# Patient Record
Sex: FEMALE | Race: BLACK OR AFRICAN AMERICAN
[De-identification: names, ages, dates, MRNs, and addresses within clinical notes are randomized per-mention and may not be internally consistent; named-entity substitution may affect disease eponyms.]

---

## 2017-05-08 ENCOUNTER — HOSPITAL ENCOUNTER (EMERGENCY)
Dept: HOSPITAL 23 - CED | Age: 27
Discharge: HOME | End: 2017-05-08
Payer: COMMERCIAL

## 2017-05-08 DIAGNOSIS — J06.9: Primary | ICD-10-CM

## 2017-05-08 DIAGNOSIS — J45.909: ICD-10-CM

## 2017-05-08 DIAGNOSIS — H66.92: ICD-10-CM

## 2017-05-08 DIAGNOSIS — F17.200: ICD-10-CM

## 2020-06-23 ENCOUNTER — HOSPITAL ENCOUNTER (EMERGENCY)
Facility: HOSPITAL | Age: 30
Discharge: HOME | End: 2020-06-23
Attending: EMERGENCY MEDICINE | Admitting: EMERGENCY MEDICINE

## 2020-06-23 VITALS
HEIGHT: 63 IN | SYSTOLIC BLOOD PRESSURE: 144 MMHG | TEMPERATURE: 98 F | BODY MASS INDEX: 35.62 KG/M2 | DIASTOLIC BLOOD PRESSURE: 91 MMHG | RESPIRATION RATE: 14 BRPM | WEIGHT: 201.06 LBS | OXYGEN SATURATION: 95 % | HEART RATE: 92 BPM
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
## 2020-06-23 DIAGNOSIS — S00.06XA INSECT BITE OF SCALP, INITIAL ENCOUNTER: Primary | ICD-10-CM

## 2020-06-23 PROCEDURE — 99282 EMERGENCY DEPT VISIT SF MDM: CPT

## 2020-06-23 RX ORDER — SULFAMETHOXAZOLE AND TRIMETHOPRIM 800; 160 MG/1; MG/1
1 TABLET ORAL 2 TIMES DAILY
Qty: 14 TABLET | Refills: 0 | Status: SHIPPED | OUTPATIENT
Start: 2020-06-23 | End: 2022-11-11

## 2020-06-23 NOTE — DISCHARGE INSTRUCTIONS
Return for increased pain, increased swelling, fever or any other concerns.  Utilize warm compresses

## 2020-06-23 NOTE — ED PROVIDER NOTES
"Subjective   History of Present Illness  Painful knot on head  30-year-old female states she has a painful knot on top of her head that she thinks it may be an insect bite from over the weekend when she was fishing.  She reports no fevers or chills but reports some increased pain and some drainage.  She denies tick bites  Review of Systems   Constitutional: Negative.    Skin: Positive for rash and wound.   Neurological: Negative.        History reviewed. No pertinent past medical history.    Allergies   Allergen Reactions   • Wellbutrin [Bupropion] Hives       History reviewed. No pertinent surgical history.    No family history on file.    Social History     Socioeconomic History   • Marital status: Single     Spouse name: Not on file   • Number of children: Not on file   • Years of education: Not on file   • Highest education level: Not on file       Prior to Admission medications    Not on File     /94 (BP Location: Left arm, Patient Position: Sitting)   Pulse 105   Temp 97.8 °F (36.6 °C) (Oral)   Resp 14   Ht 160 cm (63\")   Wt 91.2 kg (201 lb 1 oz)   LMP 06/04/2020   SpO2 94%   Breastfeeding No   BMI 35.62 kg/m²   I examined the patient using the appropriate personal protective equipment.        Objective   Physical Exam  General: Well-appearing, no acute distress  Psych: Oriented, pleasant affect  Respirations: Clear, nonlabored respirations  Skin: There is a excoriated and crusted small about 5 mm papule on the top of the scalp with some slight surrounding erythema there is no fluctuance or drainage, there are no other lesions  Procedures           ED Course                                           MDM  Patient was advised the findings.  I do not see any additional lesions to suggest shingles although this would also be in the differential.  Given her reported history of insect bite, this may be an infected insect bite I do not see any ticks or stingers.  She was prescribed Bactrim she is " discharged good condition she was given warning signs for return.  Final diagnoses:   Insect bite of scalp, initial encounter            Esa Galeas MD  06/23/20 0974

## 2020-06-23 NOTE — ED NOTES
"Pt reports yesterday during the day she noted a knot on the top of her head that she thought was a bug bite, states that today she starting have severe pain with it and pressure states she feels like she can feel her pulse in the \"cyst\"   Pt is very tearful and anxious      Nancy Shoemaker RN  06/23/20 0560    "

## 2020-06-26 ENCOUNTER — HOSPITAL ENCOUNTER (EMERGENCY)
Facility: HOSPITAL | Age: 30
Discharge: HOME | End: 2020-06-26
Attending: EMERGENCY MEDICINE | Admitting: EMERGENCY MEDICINE

## 2020-06-26 VITALS
TEMPERATURE: 98.6 F | BODY MASS INDEX: 36.31 KG/M2 | HEART RATE: 81 BPM | DIASTOLIC BLOOD PRESSURE: 55 MMHG | WEIGHT: 197.31 LBS | HEIGHT: 62 IN | OXYGEN SATURATION: 98 % | RESPIRATION RATE: 16 BRPM | SYSTOLIC BLOOD PRESSURE: 113 MMHG

## 2020-06-26 DIAGNOSIS — L02.811 SCALP ABSCESS: Primary | ICD-10-CM

## 2020-06-26 DIAGNOSIS — L02.211 ABDOMINAL WALL ABSCESS: ICD-10-CM

## 2020-06-26 PROCEDURE — 87070 CULTURE OTHR SPECIMN AEROBIC: CPT | Performed by: NURSE PRACTITIONER

## 2020-06-26 PROCEDURE — 87205 SMEAR GRAM STAIN: CPT | Performed by: NURSE PRACTITIONER

## 2020-06-26 PROCEDURE — 87147 CULTURE TYPE IMMUNOLOGIC: CPT | Performed by: NURSE PRACTITIONER

## 2020-06-26 PROCEDURE — 87186 SC STD MICRODIL/AGAR DIL: CPT | Performed by: NURSE PRACTITIONER

## 2020-06-26 PROCEDURE — 99283 EMERGENCY DEPT VISIT LOW MDM: CPT

## 2020-06-26 RX ORDER — CEPHALEXIN 500 MG/1
500 CAPSULE ORAL 3 TIMES DAILY
Qty: 21 CAPSULE | Refills: 0 | Status: SHIPPED | OUTPATIENT
Start: 2020-06-26 | End: 2022-11-11

## 2020-06-28 LAB
BACTERIA SPEC AEROBE CULT: ABNORMAL
BACTERIA SPEC AEROBE CULT: ABNORMAL
GRAM STN SPEC: ABNORMAL

## 2020-08-21 ENCOUNTER — HOSPITAL ENCOUNTER (EMERGENCY)
Facility: HOSPITAL | Age: 30
Discharge: LEFT BEFORE DISCHARGE | End: 2020-08-21
Admitting: EMERGENCY MEDICINE

## 2020-08-21 VITALS
WEIGHT: 200 LBS | SYSTOLIC BLOOD PRESSURE: 161 MMHG | DIASTOLIC BLOOD PRESSURE: 93 MMHG | OXYGEN SATURATION: 99 % | HEIGHT: 63 IN | HEART RATE: 146 BPM | BODY MASS INDEX: 35.44 KG/M2 | RESPIRATION RATE: 26 BRPM | TEMPERATURE: 100.1 F

## 2020-08-21 DIAGNOSIS — Z53.21 ELOPED FROM EMERGENCY DEPARTMENT: Primary | ICD-10-CM

## 2020-08-21 DIAGNOSIS — F15.10 METHAMPHETAMINE ABUSE (HCC): ICD-10-CM

## 2020-08-21 LAB
ALBUMIN SERPL-MCNC: 5.1 G/DL (ref 3.5–5.2)
ALBUMIN/GLOB SERPL: 2 G/DL
ALP SERPL-CCNC: 92 U/L (ref 39–117)
ALT SERPL W P-5'-P-CCNC: 16 U/L (ref 1–33)
ANION GAP SERPL CALCULATED.3IONS-SCNC: 15 MMOL/L (ref 5–15)
APAP SERPL-MCNC: <5 MCG/ML (ref 10–30)
AST SERPL-CCNC: 30 U/L (ref 1–32)
BASOPHILS # BLD AUTO: 0 10*3/MM3 (ref 0–0.2)
BASOPHILS NFR BLD AUTO: 0.3 % (ref 0–1.5)
BILIRUB SERPL-MCNC: 0.5 MG/DL (ref 0–1.2)
BUN SERPL-MCNC: 11 MG/DL (ref 6–20)
BUN SERPL-MCNC: ABNORMAL MG/DL
BUN/CREAT SERPL: ABNORMAL
CALCIUM SPEC-SCNC: 9.6 MG/DL (ref 8.6–10.5)
CHLORIDE SERPL-SCNC: 101 MMOL/L (ref 98–107)
CO2 SERPL-SCNC: 24 MMOL/L (ref 22–29)
CREAT SERPL-MCNC: 0.84 MG/DL (ref 0.57–1)
DEPRECATED RDW RBC AUTO: 58.2 FL (ref 37–54)
EOSINOPHIL # BLD AUTO: 0 10*3/MM3 (ref 0–0.4)
EOSINOPHIL NFR BLD AUTO: 0.1 % (ref 0.3–6.2)
ERYTHROCYTE [DISTWIDTH] IN BLOOD BY AUTOMATED COUNT: 19.4 % (ref 12.3–15.4)
ETHANOL UR QL: <0.01 %
GFR SERPL CREATININE-BSD FRML MDRD: 98 ML/MIN/1.73
GLOBULIN UR ELPH-MCNC: 2.6 GM/DL
GLUCOSE SERPL-MCNC: 168 MG/DL (ref 65–99)
HCT VFR BLD AUTO: 30.3 % (ref 34–46.6)
HGB BLD-MCNC: 9.7 G/DL (ref 12–15.9)
LYMPHOCYTES # BLD AUTO: 2 10*3/MM3 (ref 0.7–3.1)
LYMPHOCYTES NFR BLD AUTO: 24.6 % (ref 19.6–45.3)
MCH RBC QN AUTO: 27.6 PG (ref 26.6–33)
MCHC RBC AUTO-ENTMCNC: 32.1 G/DL (ref 31.5–35.7)
MCV RBC AUTO: 86 FL (ref 79–97)
MONOCYTES # BLD AUTO: 1.3 10*3/MM3 (ref 0.1–0.9)
MONOCYTES NFR BLD AUTO: 15.5 % (ref 5–12)
NEUTROPHILS NFR BLD AUTO: 5 10*3/MM3 (ref 1.7–7)
NEUTROPHILS NFR BLD AUTO: 59.5 % (ref 42.7–76)
NRBC BLD AUTO-RTO: 0 /100 WBC (ref 0–0.2)
PLATELET # BLD AUTO: 392 10*3/MM3 (ref 140–450)
PMV BLD AUTO: 7.7 FL (ref 6–12)
POTASSIUM SERPL-SCNC: 3.3 MMOL/L (ref 3.5–5.2)
PROT SERPL-MCNC: 7.7 G/DL (ref 6–8.5)
RBC # BLD AUTO: 3.53 10*6/MM3 (ref 3.77–5.28)
SALICYLATES SERPL-MCNC: <0.3 MG/DL
SODIUM SERPL-SCNC: 140 MMOL/L (ref 136–145)
T4 FREE SERPL-MCNC: 1.32 NG/DL (ref 0.93–1.7)
T4 SERPL-MCNC: 9.15 MCG/DL (ref 4.5–11.7)
TSH SERPL DL<=0.05 MIU/L-ACNC: 1.41 UIU/ML (ref 0.27–4.2)
TSH SERPL DL<=0.05 MIU/L-ACNC: 1.42 UIU/ML (ref 0.27–4.2)
WBC # BLD AUTO: 8.3 10*3/MM3 (ref 3.4–10.8)

## 2020-08-21 PROCEDURE — 85025 COMPLETE CBC W/AUTO DIFF WBC: CPT | Performed by: NURSE PRACTITIONER

## 2020-08-21 PROCEDURE — 80307 DRUG TEST PRSMV CHEM ANLYZR: CPT | Performed by: NURSE PRACTITIONER

## 2020-08-21 PROCEDURE — 80053 COMPREHEN METABOLIC PANEL: CPT | Performed by: NURSE PRACTITIONER

## 2020-08-21 PROCEDURE — 84443 ASSAY THYROID STIM HORMONE: CPT | Performed by: NURSE PRACTITIONER

## 2020-08-21 PROCEDURE — 84439 ASSAY OF FREE THYROXINE: CPT | Performed by: NURSE PRACTITIONER

## 2020-08-21 PROCEDURE — 25010000002 LORAZEPAM PER 2 MG: Performed by: NURSE PRACTITIONER

## 2020-08-21 PROCEDURE — 99283 EMERGENCY DEPT VISIT LOW MDM: CPT

## 2020-08-21 PROCEDURE — 96374 THER/PROPH/DIAG INJ IV PUSH: CPT

## 2020-08-21 RX ORDER — LORAZEPAM 2 MG/ML
1 INJECTION INTRAMUSCULAR ONCE
Status: COMPLETED | OUTPATIENT
Start: 2020-08-21 | End: 2020-08-21

## 2020-08-21 RX ORDER — HALOPERIDOL 5 MG/ML
1 INJECTION INTRAMUSCULAR ONCE
Status: DISCONTINUED | OUTPATIENT
Start: 2020-08-21 | End: 2020-08-21

## 2020-08-21 RX ORDER — HALOPERIDOL 5 MG/ML
10 INJECTION INTRAMUSCULAR ONCE
Status: DISCONTINUED | OUTPATIENT
Start: 2020-08-21 | End: 2020-08-21

## 2020-08-21 RX ORDER — SODIUM CHLORIDE 0.9 % (FLUSH) 0.9 %
10 SYRINGE (ML) INJECTION AS NEEDED
Status: DISCONTINUED | OUTPATIENT
Start: 2020-08-21 | End: 2020-08-21 | Stop reason: HOSPADM

## 2020-08-21 RX ADMIN — SODIUM CHLORIDE 1000 ML: 900 INJECTION, SOLUTION INTRAVENOUS at 00:58

## 2020-08-21 RX ADMIN — LORAZEPAM 1 MG: 2 INJECTION INTRAMUSCULAR; INTRAVENOUS at 00:48

## 2020-08-21 NOTE — ED PROVIDER NOTES
Subjective   30-year-old hyperactive, tangential female presents via [employer] for methamphetamine use.  Officer brought the patient reports that the patient had put a call out for alleged kidnapping.  Upon arrival patient was in her underwear and laying on a towel and thought that she was on the beach.     1. Location: Denies  2. Quality: Hyperactive  3. Severity: 0  4. Worsening factors: None   5. Alleviating factors: None  6. Onset: Prior to arrival  7. Radiation: None  8. Frequency: constant  9. Co-morbidities: No past medical history on file.  Methamphetamine use   10. Source: patient and [employer]            Review of Systems   HENT: Negative for ear discharge and rhinorrhea.    Respiratory: Negative for chest tightness and shortness of breath.    Cardiovascular: Negative for chest pain and palpitations.   Gastrointestinal: Negative for abdominal pain, nausea and vomiting.   Musculoskeletal: Negative for arthralgias, myalgias and neck stiffness.   Skin: Negative for color change, pallor, rash and wound.   Neurological: Negative for dizziness, speech difficulty, weakness, numbness and headaches.   Psychiatric/Behavioral: Negative for sleep disturbance. The patient is nervous/anxious and is hyperactive.    All other systems reviewed and are negative.      No past medical history on file.    Allergies   Allergen Reactions   • Wellbutrin [Bupropion] Hives       No past surgical history on file.    No family history on file.    Social History     Socioeconomic History   • Marital status: Single     Spouse name: Not on file   • Number of children: Not on file   • Years of education: Not on file   • Highest education level: Not on file           Objective   Physical Exam   Constitutional: She is oriented to person, place, and time. She appears well-developed and well-nourished. She is active. She appears distressed.   HENT:   Head: Normocephalic and atraumatic. Head is without raccoon's eyes and without Ernst's sign.   Right  Ear: External ear normal. No drainage.   Left Ear: External ear normal. No drainage.   Nose: Nose normal. No rhinorrhea.   Mouth/Throat: Uvula is midline, oropharynx is clear and moist and mucous membranes are normal.   Eyes: Pupils are equal, round, and reactive to light. Conjunctivae, EOM and lids are normal.   Slit lamp exam:       The right eye shows no hyphema.        The left eye shows no hyphema.   Neck: Trachea normal, normal range of motion, full passive range of motion without pain and phonation normal. Neck supple.   Cardiovascular: Normal rate, regular rhythm, S1 normal, S2 normal, normal heart sounds, intact distal pulses and normal pulses. Exam reveals no gallop and no friction rub.   No murmur heard.  Pulses:       Radial pulses are 2+ on the right side, and 2+ on the left side.        Dorsalis pedis pulses are 2+ on the right side, and 2+ on the left side.        Posterior tibial pulses are 2+ on the right side, and 2+ on the left side.   Pulmonary/Chest: Effort normal and breath sounds normal. No respiratory distress. She has no wheezes. She has no rales. She exhibits no tenderness.   Neurological: She is alert and oriented to person, place, and time. She has normal strength. GCS eye subscore is 4. GCS verbal subscore is 5. GCS motor subscore is 6.   Skin: Skin is warm and dry. Capillary refill takes less than 2 seconds. No rash noted.   Psychiatric: Her mood appears anxious. Her speech is rapid and/or pressured and tangential. She is hyperactive. She is not actively hallucinating. Thought content is delusional. Cognition and memory are normal. She expresses impulsivity. She is inattentive.   Nursing note and vitals reviewed.      Procedures           ED Course      No radiology results for the last day  Medications   LORazepam (ATIVAN) injection 1 mg (1 mg Intravenous Given 8/21/20 0048)   sodium chloride 0.9 % bolus 1,000 mL (0 mL Intravenous Stopped 8/21/20 0138)     Labs Reviewed   COMPREHENSIVE  METABOLIC PANEL - Abnormal; Notable for the following components:       Result Value    Glucose 168 (*)     Potassium 3.3 (*)     All other components within normal limits    Narrative:     GFR Normal >60  Chronic Kidney Disease <60  Kidney Failure <15     ACETAMINOPHEN LEVEL - Abnormal; Notable for the following components:    Acetaminophen <5.0 (*)     All other components within normal limits    Narrative:     Acetaminophen Therapeutic Range  5-20 ug/mL      Hours after ingestion            Toxic Value    4 Hours                           150 ug/mL    8 Hours                            70 ug/mL   12 Hours                            40 ug/mL   16 Hours                            20 ug/mL    These values apply to a single ingestion only.    CBC WITH AUTO DIFFERENTIAL - Abnormal; Notable for the following components:    RBC 3.53 (*)     Hemoglobin 9.7 (*)     Hematocrit 30.3 (*)     RDW 19.4 (*)     RDW-SD 58.2 (*)     Monocyte % 15.5 (*)     Eosinophil % 0.1 (*)     Monocytes, Absolute 1.30 (*)     All other components within normal limits   SALICYLATE LEVEL - Normal   TSH - Normal   TSH - Normal   T4, FREE - Normal    Narrative:     Results may be falsely increased if patient taking Biotin.     BUN - Normal   ETHANOL    Narrative:     Plasma Ethanol Clinical Symptoms:    ETOH (%)               Clinical Symptom  .01-.05              No apparent influence  .03-.12              Euphoria, Diminished judgment and attention   .09-.25              Impaired comprehension, Muscle incoordination  .18-.30              Confusion, Staggered gait, Slurred speech  .25-.40              Markedly decreased response to stimuli, unable to stand or                        walk, vomitting, sleep or stupor  .35-.50              Comatose, Anesthesia, Subnormal body temperature       T4   CBC AND DIFFERENTIAL    Narrative:     The following orders were created for panel order CBC & Differential.  Procedure                                Abnormality         Status                     ---------                               -----------         ------                     CBC Auto Differential[[id number]]        Abnormal            Final result                 Please view results for these tests on the individual orders.                                          MDM  Number of Diagnoses or Management Options  Eloped from emergency department:   Methamphetamine abuse (CMS/[facility]):   Diagnosis management comments: Chart Review: 6/26/2020 patient was seen at this facility for scalp and abdominal wall abscess.  Comorbidity: No past medical history on file.  Imaging: Was interpreted by physician and reviewed by myself:    Patient undressed and placed in gown for exam.  Appropriate PPE worn during patient exam. 30-year-old hyperactive, tangential female presents via [employer] for methamphetamine use.  Officer brought the patient reports that the patient had put a call out for alleged kidnapping.  Upon arrival patient was in her underwear and laying on a towel and thought that she was on the beach.  IV established and labs obtained.  Normal saline 1 L bolus and Ativan 1 mg IV given.  Upon reassessment, patient remains hyperactive, tangential, and pacing in the room.  An order was given for Haldol due to patient yelling obscenities.  Patient requested Geodon by name.  Order was changed from Haldol to Geodon.  Upon nurse entering the room to administer medication, patient reported she would like to leave.  Patient eloped.    Disposition/Treatment: Discussed results with patient, verbalized understanding.  Discussed reasons to return to the ER, patient verbalized understanding.  Agreeable with plan of care.  Patient was stable upon discharge.               Amount and/or Complexity of Data Reviewed  Clinical lab tests: reviewed  Decide to obtain previous medical records or to obtain history from someone other than the patient: yes    Patient Progress  Patient progress:  stable      Final diagnoses:   Eloped from emergency department   Methamphetamine abuse (CMS/[facility])            Blaire Stout NP  08/21/20 0425

## 2020-08-21 NOTE — ED NOTES
"Pt arrived to ED by [employer]. Officer states pt was found in a manic state. Pt admits to using meth yesterday, states she did \"1 line\". Pt not compliant with staff, pt not wearing gown and will not allow staff to take a full set of VS. Pt appears to be very verbally hyperactive and is walking around the room and throwing her belongings around the room. Belongings were locked up by security, all furniture removed from pt room for safety. Pt is at the sink attempting to bathe herself and is rubbing hand  all over her body stating \"If I get covid 19 it's because of you all\", pt will not wear mask. IV fluids started and pt is still walking around the room. Pt will not sit in bed. Calming techniques attempted with pt with no success. Security and PD at bedside.      Apoorva Gonzalez LPN  08/21/20 0103    Pt states she also takes suboxone and smokes marijuana     Apoorva Gonzalez LPN  08/21/20 0105    Pt denies SI/HI     Apoorva Gonzalez LPN  08/21/20 0118    "

## 2022-11-11 ENCOUNTER — HOSPITAL ENCOUNTER (OUTPATIENT)
Facility: HOSPITAL | Age: 32
Setting detail: OBSERVATION
Discharge: HOME | End: 2022-11-12
Attending: EMERGENCY MEDICINE | Admitting: INTERNAL MEDICINE

## 2022-11-11 ENCOUNTER — APPOINTMENT (OUTPATIENT)
Dept: CT IMAGING | Facility: HOSPITAL | Age: 32
End: 2022-11-11

## 2022-11-11 ENCOUNTER — APPOINTMENT (OUTPATIENT)
Dept: GENERAL RADIOLOGY | Facility: HOSPITAL | Age: 32
End: 2022-11-11

## 2022-11-11 ENCOUNTER — APPOINTMENT (OUTPATIENT)
Dept: CARDIOLOGY | Facility: HOSPITAL | Age: 32
End: 2022-11-11

## 2022-11-11 DIAGNOSIS — R55 SYNCOPE, UNSPECIFIED SYNCOPE TYPE: Primary | ICD-10-CM

## 2022-11-11 PROBLEM — D50.9 IRON DEFICIENCY ANEMIA: Status: ACTIVE | Noted: 2022-11-11

## 2022-11-11 PROBLEM — F32.A ANXIETY AND DEPRESSION: Status: ACTIVE | Noted: 2022-11-11

## 2022-11-11 PROBLEM — F41.9 ANXIETY AND DEPRESSION: Status: ACTIVE | Noted: 2022-11-11

## 2022-11-11 PROBLEM — E87.6 HYPOKALEMIA: Status: ACTIVE | Noted: 2022-11-11

## 2022-11-11 PROBLEM — I10 ESSENTIAL HYPERTENSION: Status: ACTIVE | Noted: 2022-11-11

## 2022-11-11 LAB
ALBUMIN SERPL-MCNC: 3.8 G/DL (ref 3.5–5.2)
ALBUMIN/GLOB SERPL: 1.3 G/DL
ALP SERPL-CCNC: 104 U/L (ref 39–117)
ALT SERPL W P-5'-P-CCNC: 12 U/L (ref 1–33)
AMPHET+METHAMPHET UR QL: NEGATIVE
ANION GAP SERPL CALCULATED.3IONS-SCNC: 11 MMOL/L (ref 5–15)
ANION GAP SERPL CALCULATED.3IONS-SCNC: 9 MMOL/L (ref 5–15)
AST SERPL-CCNC: 14 U/L (ref 1–32)
B-HCG UR QL: NEGATIVE
BACTERIA UR QL AUTO: ABNORMAL /HPF
BARBITURATES UR QL SCN: NEGATIVE
BASOPHILS # BLD AUTO: 0 10*3/MM3 (ref 0–0.2)
BASOPHILS NFR BLD AUTO: 0.4 % (ref 0–1.5)
BENZODIAZ UR QL SCN: NEGATIVE
BH CV XLRA MEAS LEFT DIST CCA EDV: -34.5 CM/SEC
BH CV XLRA MEAS LEFT DIST CCA PSV: -122 CM/SEC
BH CV XLRA MEAS LEFT DIST ICA EDV: -52.9 CM/SEC
BH CV XLRA MEAS LEFT DIST ICA PSV: -114 CM/SEC
BH CV XLRA MEAS LEFT ICA/CCA RATIO: -0.7
BH CV XLRA MEAS LEFT PROX CCA EDV: 43.9 CM/SEC
BH CV XLRA MEAS LEFT PROX CCA PSV: 162 CM/SEC
BH CV XLRA MEAS LEFT PROX ECA PSV: -135 CM/SEC
BH CV XLRA MEAS LEFT PROX ICA EDV: -45.9 CM/SEC
BH CV XLRA MEAS LEFT PROX ICA PSV: -114 CM/SEC
BH CV XLRA MEAS LEFT PROX SCLA PSV: 188 CM/SEC
BH CV XLRA MEAS LEFT VERTEBRAL A PSV: -69.3 CM/SEC
BH CV XLRA MEAS RIGHT DIST CCA EDV: -35.3 CM/SEC
BH CV XLRA MEAS RIGHT DIST CCA PSV: -113 CM/SEC
BH CV XLRA MEAS RIGHT DIST ICA EDV: -41.6 CM/SEC
BH CV XLRA MEAS RIGHT DIST ICA PSV: -107 CM/SEC
BH CV XLRA MEAS RIGHT ICA/CCA RATIO: -0.82
BH CV XLRA MEAS RIGHT PROX CCA EDV: 29.6 CM/SEC
BH CV XLRA MEAS RIGHT PROX CCA PSV: 139 CM/SEC
BH CV XLRA MEAS RIGHT PROX ECA PSV: -130 CM/SEC
BH CV XLRA MEAS RIGHT PROX ICA EDV: -39.5 CM/SEC
BH CV XLRA MEAS RIGHT PROX ICA PSV: -114 CM/SEC
BH CV XLRA MEAS RIGHT PROX SCLA PSV: 126 CM/SEC
BH CV XLRA MEAS RIGHT VERTEBRAL A PSV: 84 CM/SEC
BILIRUB SERPL-MCNC: <0.2 MG/DL (ref 0–1.2)
BILIRUB UR QL STRIP: NEGATIVE
BUN SERPL-MCNC: 6 MG/DL (ref 6–20)
BUN SERPL-MCNC: 6 MG/DL (ref 6–20)
BUN/CREAT SERPL: 10.7 (ref 7–25)
BUN/CREAT SERPL: 8.8 (ref 7–25)
CALCIUM SPEC-SCNC: 8.7 MG/DL (ref 8.6–10.5)
CALCIUM SPEC-SCNC: 8.9 MG/DL (ref 8.6–10.5)
CANNABINOIDS SERPL QL: NEGATIVE
CHLORIDE SERPL-SCNC: 102 MMOL/L (ref 98–107)
CHLORIDE SERPL-SCNC: 104 MMOL/L (ref 98–107)
CLARITY UR: ABNORMAL
CO2 SERPL-SCNC: 25 MMOL/L (ref 22–29)
CO2 SERPL-SCNC: 25 MMOL/L (ref 22–29)
COCAINE UR QL: NEGATIVE
COLOR UR: YELLOW
CREAT SERPL-MCNC: 0.56 MG/DL (ref 0.57–1)
CREAT SERPL-MCNC: 0.68 MG/DL (ref 0.57–1)
DEPRECATED RDW RBC AUTO: 52.9 FL (ref 37–54)
EGFRCR SERPLBLD CKD-EPI 2021: 120.3 ML/MIN/1.73
EGFRCR SERPLBLD CKD-EPI 2021: 126.1 ML/MIN/1.73
EOSINOPHIL # BLD AUTO: 0.1 10*3/MM3 (ref 0–0.4)
EOSINOPHIL NFR BLD AUTO: 1.9 % (ref 0.3–6.2)
ERYTHROCYTE [DISTWIDTH] IN BLOOD BY AUTOMATED COUNT: 18.1 % (ref 12.3–15.4)
ETHANOL UR QL: <0.01 %
FERRITIN SERPL-MCNC: 6.8 NG/ML (ref 13–150)
FOLATE SERPL-MCNC: 9.55 NG/ML (ref 4.78–24.2)
GLOBULIN UR ELPH-MCNC: 2.9 GM/DL
GLUCOSE SERPL-MCNC: 119 MG/DL (ref 65–99)
GLUCOSE SERPL-MCNC: 169 MG/DL (ref 65–99)
GLUCOSE UR STRIP-MCNC: NEGATIVE MG/DL
HBA1C MFR BLD: 5.6 % (ref 3.5–5.6)
HCT VFR BLD AUTO: 28.9 % (ref 34–46.6)
HGB BLD-MCNC: 9.1 G/DL (ref 12–15.9)
HGB UR QL STRIP.AUTO: NEGATIVE
HOLD SPECIMEN: NORMAL
HOLD SPECIMEN: NORMAL
HYALINE CASTS UR QL AUTO: ABNORMAL /LPF
IRON 24H UR-MRATE: 20 MCG/DL (ref 37–145)
IRON SATN MFR SERPL: 5 % (ref 20–50)
KETONES UR QL STRIP: ABNORMAL
LEUKOCYTE ESTERASE UR QL STRIP.AUTO: NEGATIVE
LYMPHOCYTES # BLD AUTO: 2.1 10*3/MM3 (ref 0.7–3.1)
LYMPHOCYTES NFR BLD AUTO: 37.8 % (ref 19.6–45.3)
MAGNESIUM SERPL-MCNC: 1.6 MG/DL (ref 1.6–2.6)
MAXIMAL PREDICTED HEART RATE: 190 BPM
MCH RBC QN AUTO: 25.9 PG (ref 26.6–33)
MCHC RBC AUTO-ENTMCNC: 31.4 G/DL (ref 31.5–35.7)
MCV RBC AUTO: 82.5 FL (ref 79–97)
METHADONE UR QL SCN: NEGATIVE
MONOCYTES # BLD AUTO: 0.3 10*3/MM3 (ref 0.1–0.9)
MONOCYTES NFR BLD AUTO: 5.8 % (ref 5–12)
MUCOUS THREADS URNS QL MICRO: ABNORMAL /HPF
NEUTROPHILS NFR BLD AUTO: 3 10*3/MM3 (ref 1.7–7)
NEUTROPHILS NFR BLD AUTO: 54.1 % (ref 42.7–76)
NITRITE UR QL STRIP: NEGATIVE
NRBC BLD AUTO-RTO: 0 /100 WBC (ref 0–0.2)
OPIATES UR QL: NEGATIVE
OXYCODONE UR QL SCN: NEGATIVE
PH UR STRIP.AUTO: 6 [PH] (ref 5–8)
PLATELET # BLD AUTO: 445 10*3/MM3 (ref 140–450)
PMV BLD AUTO: 6.5 FL (ref 6–12)
POTASSIUM SERPL-SCNC: 3 MMOL/L (ref 3.5–5.2)
POTASSIUM SERPL-SCNC: 3.8 MMOL/L (ref 3.5–5.2)
PROT SERPL-MCNC: 6.7 G/DL (ref 6–8.5)
PROT UR QL STRIP: ABNORMAL
RBC # BLD AUTO: 3.51 10*6/MM3 (ref 3.77–5.28)
RBC # UR STRIP: ABNORMAL /HPF
REF LAB TEST METHOD: ABNORMAL
SODIUM SERPL-SCNC: 138 MMOL/L (ref 136–145)
SODIUM SERPL-SCNC: 138 MMOL/L (ref 136–145)
SP GR UR STRIP: 1.03 (ref 1–1.03)
SQUAMOUS #/AREA URNS HPF: ABNORMAL /HPF
STRESS TARGET HR: 162 BPM
TIBC SERPL-MCNC: 431 MCG/DL (ref 298–536)
TRANSFERRIN SERPL-MCNC: 289 MG/DL (ref 200–360)
TSH SERPL DL<=0.05 MIU/L-ACNC: 1.17 UIU/ML (ref 0.27–4.2)
UROBILINOGEN UR QL STRIP: ABNORMAL
VIT B12 BLD-MCNC: 462 PG/ML (ref 211–946)
WBC # UR STRIP: ABNORMAL /HPF
WBC NRBC COR # BLD: 5.5 10*3/MM3 (ref 3.4–10.8)
WHOLE BLOOD HOLD COAG: NORMAL
WHOLE BLOOD HOLD SPECIMEN: NORMAL

## 2022-11-11 PROCEDURE — 81001 URINALYSIS AUTO W/SCOPE: CPT | Performed by: NURSE PRACTITIONER

## 2022-11-11 PROCEDURE — G0378 HOSPITAL OBSERVATION PER HR: HCPCS

## 2022-11-11 PROCEDURE — 80307 DRUG TEST PRSMV CHEM ANLYZR: CPT | Performed by: NURSE PRACTITIONER

## 2022-11-11 PROCEDURE — 82077 ASSAY SPEC XCP UR&BREATH IA: CPT | Performed by: NURSE PRACTITIONER

## 2022-11-11 PROCEDURE — 83540 ASSAY OF IRON: CPT | Performed by: STUDENT IN AN ORGANIZED HEALTH CARE EDUCATION/TRAINING PROGRAM

## 2022-11-11 PROCEDURE — 99285 EMERGENCY DEPT VISIT HI MDM: CPT

## 2022-11-11 PROCEDURE — 82746 ASSAY OF FOLIC ACID SERUM: CPT | Performed by: STUDENT IN AN ORGANIZED HEALTH CARE EDUCATION/TRAINING PROGRAM

## 2022-11-11 PROCEDURE — 93880 EXTRACRANIAL BILAT STUDY: CPT

## 2022-11-11 PROCEDURE — 70450 CT HEAD/BRAIN W/O DYE: CPT

## 2022-11-11 PROCEDURE — 84466 ASSAY OF TRANSFERRIN: CPT | Performed by: STUDENT IN AN ORGANIZED HEALTH CARE EDUCATION/TRAINING PROGRAM

## 2022-11-11 PROCEDURE — 81025 URINE PREGNANCY TEST: CPT | Performed by: NURSE PRACTITIONER

## 2022-11-11 PROCEDURE — 99214 OFFICE O/P EST MOD 30 MIN: CPT | Performed by: NURSE PRACTITIONER

## 2022-11-11 PROCEDURE — 71045 X-RAY EXAM CHEST 1 VIEW: CPT

## 2022-11-11 PROCEDURE — 93005 ELECTROCARDIOGRAM TRACING: CPT

## 2022-11-11 PROCEDURE — 80048 BASIC METABOLIC PNL TOTAL CA: CPT | Performed by: NURSE PRACTITIONER

## 2022-11-11 PROCEDURE — 83036 HEMOGLOBIN GLYCOSYLATED A1C: CPT | Performed by: NURSE PRACTITIONER

## 2022-11-11 PROCEDURE — 87086 URINE CULTURE/COLONY COUNT: CPT | Performed by: NURSE PRACTITIONER

## 2022-11-11 PROCEDURE — 36415 COLL VENOUS BLD VENIPUNCTURE: CPT | Performed by: NURSE PRACTITIONER

## 2022-11-11 PROCEDURE — 82607 VITAMIN B-12: CPT | Performed by: STUDENT IN AN ORGANIZED HEALTH CARE EDUCATION/TRAINING PROGRAM

## 2022-11-11 PROCEDURE — 72125 CT NECK SPINE W/O DYE: CPT

## 2022-11-11 PROCEDURE — 83735 ASSAY OF MAGNESIUM: CPT | Performed by: NURSE PRACTITIONER

## 2022-11-11 PROCEDURE — 80050 GENERAL HEALTH PANEL: CPT | Performed by: NURSE PRACTITIONER

## 2022-11-11 PROCEDURE — 82728 ASSAY OF FERRITIN: CPT | Performed by: STUDENT IN AN ORGANIZED HEALTH CARE EDUCATION/TRAINING PROGRAM

## 2022-11-11 RX ORDER — PRAZOSIN HYDROCHLORIDE 2 MG/1
2 CAPSULE ORAL NIGHTLY
COMMUNITY

## 2022-11-11 RX ORDER — NITROGLYCERIN 0.4 MG/1
0.4 TABLET SUBLINGUAL
Status: DISCONTINUED | OUTPATIENT
Start: 2022-11-11 | End: 2022-11-12 | Stop reason: HOSPADM

## 2022-11-11 RX ORDER — SODIUM CHLORIDE 0.9 % (FLUSH) 0.9 %
10 SYRINGE (ML) INJECTION EVERY 12 HOURS SCHEDULED
Status: DISCONTINUED | OUTPATIENT
Start: 2022-11-11 | End: 2022-11-12 | Stop reason: HOSPADM

## 2022-11-11 RX ORDER — ESCITALOPRAM OXALATE 10 MG/1
10 TABLET ORAL DAILY
Status: DISCONTINUED | OUTPATIENT
Start: 2022-11-11 | End: 2022-11-12 | Stop reason: HOSPADM

## 2022-11-11 RX ORDER — BUSPIRONE HYDROCHLORIDE 10 MG/1
10 TABLET ORAL 2 TIMES DAILY
COMMUNITY

## 2022-11-11 RX ORDER — BUSPIRONE HYDROCHLORIDE 5 MG/1
5 TABLET ORAL 2 TIMES DAILY
Status: DISCONTINUED | OUTPATIENT
Start: 2022-11-11 | End: 2022-11-12 | Stop reason: HOSPADM

## 2022-11-11 RX ORDER — NALTREXONE HYDROCHLORIDE 50 MG/1
50 TABLET, FILM COATED ORAL DAILY
Status: DISCONTINUED | OUTPATIENT
Start: 2022-11-11 | End: 2022-11-11

## 2022-11-11 RX ORDER — ARIPIPRAZOLE 5 MG/1
5 TABLET ORAL DAILY
COMMUNITY

## 2022-11-11 RX ORDER — SODIUM CHLORIDE 0.9 % (FLUSH) 0.9 %
10 SYRINGE (ML) INJECTION AS NEEDED
Status: DISCONTINUED | OUTPATIENT
Start: 2022-11-11 | End: 2022-11-12 | Stop reason: HOSPADM

## 2022-11-11 RX ORDER — NALTREXONE HYDROCHLORIDE 50 MG/1
50 TABLET, FILM COATED ORAL DAILY
COMMUNITY

## 2022-11-11 RX ORDER — NICOTINE 21 MG/24HR
1 PATCH, TRANSDERMAL 24 HOURS TRANSDERMAL
Status: DISCONTINUED | OUTPATIENT
Start: 2022-11-11 | End: 2022-11-12 | Stop reason: HOSPADM

## 2022-11-11 RX ORDER — NALTREXONE HYDROCHLORIDE 50 MG/1
50 TABLET, FILM COATED ORAL DAILY
Status: DISCONTINUED | OUTPATIENT
Start: 2022-11-11 | End: 2022-11-12 | Stop reason: HOSPADM

## 2022-11-11 RX ORDER — ARIPIPRAZOLE 10 MG/1
5 TABLET ORAL DAILY
Status: DISCONTINUED | OUTPATIENT
Start: 2022-11-11 | End: 2022-11-12 | Stop reason: HOSPADM

## 2022-11-11 RX ORDER — POTASSIUM CHLORIDE 20 MEQ/1
40 TABLET, EXTENDED RELEASE ORAL ONCE
Status: COMPLETED | OUTPATIENT
Start: 2022-11-11 | End: 2022-11-11

## 2022-11-11 RX ORDER — ESCITALOPRAM OXALATE 10 MG/1
10 TABLET ORAL DAILY
COMMUNITY

## 2022-11-11 RX ADMIN — POTASSIUM CHLORIDE 40 MEQ: 1500 TABLET, EXTENDED RELEASE ORAL at 03:46

## 2022-11-11 RX ADMIN — BUSPIRONE HYDROCHLORIDE 5 MG: 5 TABLET ORAL at 08:26

## 2022-11-11 RX ADMIN — ESCITALOPRAM OXALATE 10 MG: 10 TABLET ORAL at 08:26

## 2022-11-11 RX ADMIN — NICOTINE 1 PATCH: 14 PATCH, EXTENDED RELEASE TRANSDERMAL at 08:11

## 2022-11-11 RX ADMIN — SODIUM CHLORIDE 500 ML: 9 INJECTION, SOLUTION INTRAVENOUS at 02:36

## 2022-11-11 RX ADMIN — Medication 10 ML: at 09:41

## 2022-11-11 RX ADMIN — NALTREXONE HYDROCHLORIDE 50 MG: 50 TABLET, FILM COATED ORAL at 22:16

## 2022-11-11 RX ADMIN — BUSPIRONE HYDROCHLORIDE 5 MG: 5 TABLET ORAL at 20:39

## 2022-11-11 RX ADMIN — ARIPIPRAZOLE 5 MG: 5 TABLET ORAL at 08:26

## 2022-11-11 NOTE — ED NOTES
Nursing report ED to floor  Uma Adams  32 y.o.  female    HPI :   Chief Complaint   Patient presents with    Syncope       Admitting doctor:   Sarah Becker DO    Admitting diagnosis:   The encounter diagnosis was Syncope, unspecified syncope type.    Code status:   Current Code Status       Date Active Code Status Order ID Comments User Context       11/11/2022 0430 CPR (Attempt to Resuscitate) [id number]  Shannan Abreu APRN ED        Question Answer    Code Status (Patient has no pulse and is not breathing) CPR (Attempt to Resuscitate)    Medical Interventions (Patient has pulse or is breathing) Full Support                    Allergies:   Wellbutrin [bupropion]    Isolation:   No active isolations    Intake and Output  No intake or output data in the 24 hours ending 11/11/22 1403    Weight:       11/11/22  0122   Weight: 127 kg (280 lb)       Most recent vitals:   Vitals:    11/11/22 0818 11/11/22 0901 11/11/22 1001 11/11/22 1101   BP:  120/74 113/64 124/71   BP Location:       Patient Position:       Pulse:  95 96 97   Resp:       Temp: 98.7 °F (37.1 °C)      TempSrc: Rectal      SpO2:  96% 96% 94%   Weight:       Height:           Active LDAs/IV Access:   Lines, Drains & Airways       Active LDAs       Name Placement date Placement time Site Days    Peripheral IV 11/11/22 0141 Right Antecubital 11/11/22 0141  Antecubital  less than 1                    Labs (abnormal labs have a star):   Labs Reviewed   COMPREHENSIVE METABOLIC PANEL - Abnormal; Notable for the following components:       Result Value    Glucose 169 (*)     Potassium 3.0 (*)     All other components within normal limits    Narrative:     GFR Normal >60  Chronic Kidney Disease <60  Kidney Failure <15     URINALYSIS W/ CULTURE IF INDICATED - Abnormal; Notable for the following components:    Appearance, UA Hazy (*)     Ketones, UA Trace (*)     Protein,  mg/dL (2+) (*)     All other components within normal limits    Narrative:      In absence of clinical symptoms, the presence of pyuria, bacteria, and/or nitrites on the urinalysis result does not correlate with infection.   CBC WITH AUTO DIFFERENTIAL - Abnormal; Notable for the following components:    RBC 3.51 (*)     Hemoglobin 9.1 (*)     Hematocrit 28.9 (*)     MCH 25.9 (*)     MCHC 31.4 (*)     RDW 18.1 (*)     All other components within normal limits   URINALYSIS, MICROSCOPIC ONLY - Abnormal; Notable for the following components:    RBC, UA 6-12 (*)     WBC, UA 6-12 (*)     Bacteria, UA Trace (*)     Squamous Epithelial Cells, UA 21-30 (*)     Mucus, UA Small/1+ (*)     All other components within normal limits   FERRITIN - Abnormal; Notable for the following components:    Ferritin 6.80 (*)     All other components within normal limits    Narrative:     Results may be falsely decreased if patient taking Biotin.     IRON PROFILE - Abnormal; Notable for the following components:    Iron 20 (*)     Iron Saturation 5 (*)     All other components within normal limits   BASIC METABOLIC PANEL - Abnormal; Notable for the following components:    Glucose 119 (*)     Creatinine 0.56 (*)     All other components within normal limits    Narrative:     GFR Normal >60  Chronic Kidney Disease <60  Kidney Failure <15     PREGNANCY, URINE - Normal   URINE DRUG SCREEN - Normal    Narrative:     Negative Thresholds Per Drugs Screened:    Amphetamines                 500 ng/ml  Barbiturates                 200 ng/ml  Benzodiazepines              100 ng/ml  Cocaine                      300 ng/ml  Methadone                    300 ng/ml  Opiates                      300 ng/ml  Oxycodone                    100 ng/ml  THC                           50 ng/ml    The Normal Value for all drugs tested is negative. This report includes final unconfirmed screening results to be used for medical treatment purposes only. Unconfirmed results must not be used for non-medical purposes such as employment or legal  testing. Clinical consideration should be applied to any drug of abuse test, particularly when unconfirmed results are used.          All urine drugs of abuse requests without chain of custody are for medical screening purposes only.  False positives are possible.     MAGNESIUM - Normal   VITAMIN B12 - Normal    Narrative:     Results may be falsely increased if patient taking Biotin.     FOLATE - Normal    Narrative:     Results may be falsely increased if patient taking Biotin.     HEMOGLOBIN A1C - Normal    Narrative:     Hemoglobin A1C Reference Range:    <5.7 %        Normal  5.7-6.4 %     Increased risk for diabetes  > 6.4 %        Diabetes       These guidelines have been recommended by the American Diabetic Association for Hgb A1c.      The following 2010 guidelines have been recommended by the American Diabetes Association for Hemoglobin A1c.    HBA1c 5.7-6.4% Increased risk for future diabetes (pre-diabetes)  HBA1c     >6.4% Diabetes     URINE CULTURE   RAINBOW DRAW    Narrative:     The following orders were created for panel order [place] Draw.  Procedure                               Abnormality         Status                     ---------                               -----------         ------                     Green Top (Gel)[[id number]]                                  Final result               Lavender Top[[id number]]                                     Final result               Gold Top - SST[[id number]]                                   Final result               Light Blue Top[[id number]]                                   Final result                 Please view results for these tests on the individual orders.   ETHANOL    Narrative:     Plasma Ethanol Clinical Symptoms:    ETOH (%)               Clinical Symptom  .01-.05              No apparent influence  .03-.12              Euphoria, Diminished judgment and attention   .09-.25              Impaired comprehension, Muscle  incoordination  .18-.30              Confusion, Staggered gait, Slurred speech  .25-.40              Markedly decreased response to stimuli, unable to stand or                        walk, vomitting, sleep or stupor  .35-.50              Comatose, Anesthesia, Subnormal body temperature       OCCULT BLOOD X 1, STOOL   GREEN TOP   LAVENDER TOP   GOLD TOP - SST   LIGHT BLUE TOP   CBC AND DIFFERENTIAL    Narrative:     The following orders were created for panel order CBC & Differential.  Procedure                               Abnormality         Status                     ---------                               -----------         ------                     CBC Auto Differential[[id number]]        Abnormal            Final result                 Please view results for these tests on the individual orders.       EKG:   ECG 12 Lead Syncope   Preliminary Result   HEART RATE= 106  bpm   RR Interval= 564  ms   IA Interval= 146  ms   P Horizontal Axis= 13  deg   P Front Axis= 41  deg   QRSD Interval= 83  ms   QT Interval= 341  ms   QRS Axis= 10  deg   T Wave Axis= 2  deg   - OTHERWISE NORMAL ECG -   Sinus tachycardia   Electronically Signed By:    Date and Time of Study: 2022-11-11 01:37:26          Meds given in ED:   Medications   sodium chloride 0.9 % flush 10 mL (has no administration in time range)   sodium chloride 0.9 % flush 10 mL (10 mL Intravenous Given 11/11/22 0941)   sodium chloride 0.9 % flush 10 mL (has no administration in time range)   nitroglycerin (NITROSTAT) SL tablet 0.4 mg (has no administration in time range)   escitalopram (LEXAPRO) tablet 10 mg (10 mg Oral Given 11/11/22 0826)   busPIRone (BUSPAR) tablet 5 mg (5 mg Oral Given 11/11/22 0826)   ARIPiprazole (ABILIFY) tablet 5 mg (5 mg Oral Given 11/11/22 0826)   naltrexone (DEPADE) tablet 50 mg (50 mg Oral Not Given 11/11/22 0921)   nicotine (NICODERM CQ) 14 MG/24HR patch 1 patch (1 patch Transdermal Medication Applied 11/11/22 0811)   sodium chloride  0.9 % bolus 500 mL (0 mL Intravenous Stopped 11/11/22 0756)   potassium chloride (K-DUR,KLOR-CON) CR tablet 40 mEq (40 mEq Oral Given 11/11/22 6866)       Imaging results:  CT Head Without Contrast    Result Date: 11/11/2022  HEAD CT: No acute intracranial hemorrhage. No acute fracture. Mild right mastoid effusion. CERVICAL SPINE CT: No acute fracture or subluxation identified within the limits of this examination. Electronically signed by:  Mir Vegas M.D.  11/11/2022 1:29 AM Mountain Time    CT Cervical Spine Without Contrast    Result Date: 11/11/2022  HEAD CT: No acute intracranial hemorrhage. No acute fracture. Mild right mastoid effusion. CERVICAL SPINE CT: No acute fracture or subluxation identified within the limits of this examination. Electronically signed by:  Mir Vegas M.D.  11/11/2022 1:29 AM Mountain Time    XR Chest 1 View    Result Date: 11/11/2022  IMPRESSION : No acute process.[  Electronically Signed By-Jasen Owen On:11/11/2022 6:44 AM This report was finalized on [id number] by  Jasen Owen, .     Ambulatory status:   - self    Social issues:   Social History     Socioeconomic History    Marital status: Single   Tobacco Use    Smoking status: Every Day     Types: Cigarettes       NIH Stroke Scale:         Prabhu Rutherford RN  11/11/22 14:03 EST

## 2022-11-11 NOTE — ED PROVIDER NOTES
Subjective    Chief Complaint   Patient presents with   • Syncope     Provider, No Known   No LMP recorded.    History of Present Illness  Patient is a 32-year-old female presents the ED via EMS with complaint of a syncopal episode, possible seizure.  Patient reports she gone to the bathroom to urinate, and woke up in the tub covered in urine and feces.  Her mother reports that she found her in the bathtub, with decreased responsiveness.  Patient reports she only resumed urinating and feeling dizzy beforehand.  She denies feeling ill otherwise.  She does complain of headache and neck pain.  She denies any chest pain or shortness of breath.  No abdominal pain.  No nausea, vomiting or diarrhea.  Patient denies visual disturbances, speech disturbances, facial droop, unilateral weakness, numbness or tingling.  Denies difficulty walking or lethargy.        Review of Systems   Constitutional: Negative for chills and fever.   Respiratory: Negative for shortness of breath.    Cardiovascular: Negative for chest pain.   Gastrointestinal: Negative for abdominal pain, diarrhea and vomiting.   Genitourinary: Negative for dysuria.   Musculoskeletal: Negative for back pain and neck pain.   Skin: Negative for color change and rash.   Neurological: Positive for dizziness, seizures, syncope and headaches. Negative for tremors, facial asymmetry, speech difficulty, weakness, light-headedness and numbness.   Psychiatric/Behavioral: Negative for confusion.       No past medical history on file.    Allergies   Allergen Reactions   • Wellbutrin [Bupropion] Hives       No past surgical history on file.    No family history on file.    Social History     Socioeconomic History   • Marital status: Single           Objective   Physical Exam  Vitals and nursing note reviewed.   Constitutional:       Appearance: Normal appearance. She is not ill-appearing or toxic-appearing.   HENT:      Head: Normocephalic and atraumatic.      Nose: Nose normal.  "     Mouth/Throat:      Mouth: Mucous membranes are moist.      Pharynx: Oropharynx is clear.   Eyes:      Extraocular Movements: Extraocular movements intact.      Conjunctiva/sclera: Conjunctivae normal.      Pupils: Pupils are equal, round, and reactive to light.   Cardiovascular:      Rate and Rhythm: Normal rate.      Heart sounds: Normal heart sounds. No murmur heard.    No friction rub. No gallop.   Pulmonary:      Effort: Pulmonary effort is normal.      Breath sounds: Normal breath sounds.   Abdominal:      General: Bowel sounds are normal.      Palpations: Abdomen is soft.   Musculoskeletal:         General: Normal range of motion.   Skin:     General: Skin is warm and dry.      Capillary Refill: Capillary refill takes less than 2 seconds.   Neurological:      General: No focal deficit present.      Mental Status: She is alert and oriented to person, place, and time.   Psychiatric:         Mood and Affect: Mood normal.         Behavior: Behavior normal.         Procedures           ED Course  ED Course as of 11/11/22 0405   Fri Nov 11, 2022   0324 Hemoglobin(!): 9.1  Previous 9.7 [LB]   0340 Discussed with hospitalist guicho [LB]      ED Course User Index  [LB] Lidia Roberson, MERCEDES      /71 (Patient Position: Standing)   Pulse 114   Temp 98 °F (36.7 °C) (Oral)   Resp 16   Ht 160 cm (63\")   Wt 127 kg (280 lb)   SpO2 99%   BMI 49.60 kg/m²   Labs Reviewed   COMPREHENSIVE METABOLIC PANEL - Abnormal; Notable for the following components:       Result Value    Glucose 169 (*)     Potassium 3.0 (*)     All other components within normal limits    Narrative:     GFR Normal >60  Chronic Kidney Disease <60  Kidney Failure <15     URINALYSIS W/ CULTURE IF INDICATED - Abnormal; Notable for the following components:    Appearance, UA Hazy (*)     Ketones, UA Trace (*)     Protein,  mg/dL (2+) (*)     All other components within normal limits    Narrative:     In absence of clinical " symptoms, the presence of pyuria, bacteria, and/or nitrites on the urinalysis result does not correlate with infection.   CBC WITH AUTO DIFFERENTIAL - Abnormal; Notable for the following components:    RBC 3.51 (*)     Hemoglobin 9.1 (*)     Hematocrit 28.9 (*)     MCH 25.9 (*)     MCHC 31.4 (*)     RDW 18.1 (*)     All other components within normal limits   URINALYSIS, MICROSCOPIC ONLY - Abnormal; Notable for the following components:    RBC, UA 6-12 (*)     WBC, UA 6-12 (*)     Bacteria, UA Trace (*)     Squamous Epithelial Cells, UA 21-30 (*)     Mucus, UA Small/1+ (*)     All other components within normal limits   PREGNANCY, URINE - Normal   URINE DRUG SCREEN - Normal    Narrative:     Negative Thresholds Per Drugs Screened:    Amphetamines                 500 ng/ml  Barbiturates                 200 ng/ml  Benzodiazepines              100 ng/ml  Cocaine                      300 ng/ml  Methadone                    300 ng/ml  Opiates                      300 ng/ml  Oxycodone                    100 ng/ml  THC                           50 ng/ml    The Normal Value for all drugs tested is negative. This report includes final unconfirmed screening results to be used for medical treatment purposes only. Unconfirmed results must not be used for non-medical purposes such as employment or legal testing. Clinical consideration should be applied to any drug of abuse test, particularly when unconfirmed results are used.          All urine drugs of abuse requests without chain of custody are for medical screening purposes only.  False positives are possible.     MAGNESIUM - Normal   URINE CULTURE   RAINBOW DRAW    Narrative:     The following orders were created for panel order [place] Draw.  Procedure                               Abnormality         Status                     ---------                               -----------         ------                     Green Top (Gel)[[id number]]                                   Final result               Lavender Top[[id number]]                                     Final result               Gold Top - SST[[id number]]                                   Final result               Light Blue Top[[id number]]                                   Final result                 Please view results for these tests on the individual orders.   ETHANOL    Narrative:     Plasma Ethanol Clinical Symptoms:    ETOH (%)               Clinical Symptom  .01-.05              No apparent influence  .03-.12              Euphoria, Diminished judgment and attention   .09-.25              Impaired comprehension, Muscle incoordination  .18-.30              Confusion, Staggered gait, Slurred speech  .25-.40              Markedly decreased response to stimuli, unable to stand or                        walk, vomitting, sleep or stupor  .35-.50              Comatose, Anesthesia, Subnormal body temperature       GREEN TOP   LAVENDER TOP   GOLD TOP - SST   LIGHT BLUE TOP   CBC AND DIFFERENTIAL    Narrative:     The following orders were created for panel order CBC & Differential.  Procedure                               Abnormality         Status                     ---------                               -----------         ------                     CBC Auto Differential[[id number]]        Abnormal            Final result                 Please view results for these tests on the individual orders.     Medications   sodium chloride 0.9 % flush 10 mL (has no administration in time range)   sodium chloride 0.9 % bolus 500 mL (500 mL Intravenous New Bag 11/11/22 0236)   potassium chloride (K-DUR,KLOR-CON) CR tablet 40 mEq (40 mEq Oral Given 11/11/22 0346)     CT Head Without Contrast    Result Date: 11/11/2022  HEAD CT: No acute intracranial hemorrhage. No acute fracture. Mild right mastoid effusion. CERVICAL SPINE CT: No acute fracture or subluxation identified within the limits of this examination. Electronically signed by:   Mir Vegas M.D.  11/11/2022 1:29 AM Mountain Time    CT Cervical Spine Without Contrast    Result Date: 11/11/2022  HEAD CT: No acute intracranial hemorrhage. No acute fracture. Mild right mastoid effusion. CERVICAL SPINE CT: No acute fracture or subluxation identified within the limits of this examination. Electronically signed by:  Mir Vegas M.D.  11/11/2022 1:29 AM Mountain Time                                         MDM    Appropriate PPE worn during patient interactions.   Differentials: Seizure, syncope, electrolyte imbalance  This is not an all inclusive list of diagnosis considered.     Patient was brought back to the emergency department room for evaluation and placed on appropriate monitoring.  Patient had IV established and blood work obtained.  Patient had CT imaging of the head and neck which revealed no acute findings.  Chest x-ray was negative.  She is noted to be hypokalemic, UA appears contaminated, patient is having urinary symptoms will not treat with for UTI at this time.  No culture was indicated.  UDS is negative.  Pregnancy test negative.  CBC reveals anemia, which patient has had in the past    Vitals:    11/11/22 0202   BP: 124/71   Pulse: 114   Resp:    Temp:    SpO2: 99%         Disposition: I discussed with the patient their test results, work-up here in the emergency department, and need for admission and further evaluation. Patient is agreeable to the plan of care. At time of disposition patients VS are reviewed, and patient without acute distress.  Opportunity was provided for questions at the bedside, all questions and concerns were addressed.    Note Disclaimer: At [facility], we believe that sharing information builds trust and better relationships. You are receiving this note because you recently visited [facility]. It is possible you will see health information before a provider has talked with you about it. This kind of information can be easy to  misunderstand. To help you fully understand what it means for your health, we urge you to discuss this note with your provider.  Note dicatated utilizing Dragon Dictation.       Final diagnoses:   Syncope, unspecified syncope type       ED Disposition  ED Disposition     ED Disposition   No Disposition Selected    Condition   --    Comment   Level of Care: Telemetry [5]   Admitting Physician: SAJI WALLS [[id number]]   Attending Physician: SAJI WALLS [[id number]]               No follow-up provider specified.       Medication List      No changes were made to your prescriptions during this visit.          Lidia Roberson, APRN  11/11/22 0406

## 2022-11-11 NOTE — H&P
"    [facility]  History & Physical    Patient Name: Uma Adams  : 1990  MRN: [id number]  Primary Care Physician:  Provider, No Known  Date of admission: 2022      Subjective      Chief Complaint: Syncope with LOC    History of Present Illness: Uma Adams is a 32 y.o. female with past medical history of anxiety depression, hypertension who presented [facility] on [facility]22 complaining of syncope with loss of consciousness.  Patient's mother at bedside and reports palpation at 1 AM lying unconscious in the bathtub.  Patient reports last thing she remembers is waking up with urge to void.  Mother reports she heard a loud noise and when she found patient lying in the bathtub was unresponsive for approximately 5 seconds.  Patient had hands around knees and had defecated on self.  Mother denies any sign of convulsion or foaming at the mouth.  Reports she was initially unresponsive but then was able to arouse but very confused but as time progressed became more alert.  Patient denies nausea or vomiting, chest pain or shortness of breath.  EMS was called and was brought to the ER.    Patient denies current PCP.  Medications have been managed by psychiatry.  Recently stopped Prozac and was started on Lexapro.  Patient also reports 7 months ago was given clonidine as needed for hypertension.  Patient reports does not have a home blood pressure monitor and only takes medication when her \"palms get sweaty\".  Last dose was 8 PM last night.  Patient reports sobriety of 16 months from drug abuse.  Positive for nicotine and vape use.    ER findings blood glucose 169, potassium 3.0.  Magnesium 1.6.  Negative for leukocytosis.  Anemia at 9.1.  Last known baseline was 9.7 on 20.  Hemoglobin has been trending down since 2019.  Urinalysis no CNS indicated.  Negative for any ethanol use urine tox screen was negative.  Chest x-ray no acute findings.  Blood " pressure 117/68 and heart rate in the low 100s.  EKG no acute findings.    Review of Systems   Constitutional: Negative.   HENT: Negative.    Eyes:        Patient verbalizes pressure behind right eye   Cardiovascular: Positive for syncope.   Respiratory: Negative.    Skin: Negative.    Musculoskeletal:        Patient verbalizes neck pain   Gastrointestinal: Negative.    Genitourinary: Negative.    Psychiatric/Behavioral: Positive for altered mental status, depression and substance abuse (Former). The patient is nervous/anxious.         Personal History     No past medical history on file.  See above HPI    No past surgical history on file.  3 C-sections, tonsillectomy, adenoidectomy   Family History: family history is not on file. Otherwise pertinent FHx was reviewed and not pertinent to current issue.    Social History:   sobriety x16 months from substance abuse, nicotine dependence of 5 to 6 cigarettes/day and also vape use    Home Medications:  Prior to Admission Medications     Prescriptions Last Dose Informant Patient Reported? Taking?    ARIPiprazole (ABILIFY) 5 MG tablet 11/10/2022  Yes Yes    Take 1 tablet by mouth Daily.    busPIRone (BUSPAR) 5 MG tablet 11/10/2022  Yes Yes    Take 1 tablet by mouth 2 (Two) Times a Day.    escitalopram (LEXAPRO) 10 MG tablet 11/10/2022  Yes Yes    Take 1 tablet by mouth Daily.    naltrexone (DEPADE) 50 MG tablet 11/10/2022  Yes Yes    Take 1 tablet by mouth Daily.    cephalexin (KEFLEX) 500 MG capsule Unknown  No No    Take 1 capsule by mouth 3 (Three) Times a Day.    sulfamethoxazole-trimethoprim (BACTRIM DS,SEPTRA DS) 800-160 MG per tablet Unknown  No No    Take 1 tablet by mouth 2 (Two) Times a Day.            Allergies:  Allergies   Allergen Reactions   • Wellbutrin [Bupropion] Hives       Objective      Vitals:   Temp:  [98 °F (36.7 °C)] 98 °F (36.7 °C)  Heart Rate:  [102-122] 114  Resp:  [16] 16  BP: (117-142)/(68-82) 124/71    Physical Exam  Constitutional:        Appearance: Normal appearance.   HENT:      Head: Normocephalic.   Eyes:      Pupils: Pupils are equal, round, and reactive to light.   Cardiovascular:      Rate and Rhythm: Regular rhythm. Tachycardia present.   Pulmonary:      Effort: Pulmonary effort is normal.      Breath sounds: Normal breath sounds.   Abdominal:      Palpations: Abdomen is soft.      Comments: Obese abdomen   Musculoskeletal:         General: Normal range of motion.      Cervical back: Normal range of motion.   Skin:     General: Skin is warm and dry.   Neurological:      General: No focal deficit present.      Mental Status: She is alert and oriented to person, place, and time.   Psychiatric:         Mood and Affect: Mood normal.         Behavior: Behavior normal.         Thought Content: Thought content normal.         Judgment: Judgment normal.          Result Review    Result Review:  I have personally reviewed the results from the time of this admission to 11/11/2022 04:21 EST and agree with these findings:  [x]  Laboratory  [x]  Microbiology  [x]  Radiology  []  EKG/Telemetry   []  Cardiology/Vascular   []  Pathology  []  Old records  []  Other:  Most notable findings include:   Ct:IMPRESSION:  HEAD CT:  No acute intracranial hemorrhage.     No acute fracture.     Mild right mastoid effusion.  CERVICAL SPINE CT:  No acute fracture or subluxation identified within the limits of this examination.     XR left wrist, forearm and elbow negative              Assessment & Plan        Active Hospital Problems:  Active Hospital Problems    Diagnosis    • Syncope and collapse    • Anxiety and depression    • Essential hypertension    • Iron deficiency anemia    • Hypokalemia      Plan:   Syncope with collapse  -Rule out medication related versus seizure versus other  -Neurology consult  -Seizure precautions    Hypertension  -Educated patient need to purchase a blood pressure cuff prior to taking clonidine    Anemia  -We will check iron  studies  -Noted patient's slow decline since 2019 and hemoglobin    Hypokalemia  -Replace orally    Anxiety and depression  -Resume patient's Abilify, Lexapro and BuSpar  -Patient is followed by psychiatry as outpatient    Substance abuse disorder  -continue naltrexone    Hyperglycemia  -Check A1c    Morbid Obesity  -bmi 49.6    Tobacco dependency  -pt request nicotine patch    DVT prophylaxis:  No DVT prophylaxis order currently exists.    CODE STATUS:       Admission Status:  I believe this patient meets observation status.    I discussed the patient's findings and my recommendations with patient and family.    This patient has been examined wearing appropriate Personal Protective Equipment     Signature: Electronically signed by MERCEDES Weinstein, 11/11/22, 04:21 EST.  [facility] Hospitalist Team

## 2022-11-11 NOTE — CONSULTS
"Primary Care Provider: Provider, No Known     Consult requested by:  Shannan Abreu APRN    Reason for Consultation: Neurological evaluation    History taken from: patient chart RN    Chief complaint: Syncopal episode        SUBJECTIVE:    History of present illness: Background per H&P: Uma Adams is a 32 y.o. year old female who presented to [facility] on 11/11/2022 complaining of syncope with loss of consciousness.  Patient's mother at bedside and reports palpation at 1 AM lying unconscious in the bathtub.  Patient reports last thing she remembers is waking up with urge to void.  Mother reports she heard a loud noise and when she found patient lying in the bathtub was unresponsive for approximately 5 seconds.  Patient had hands around knees and had defecated on self.  Mother denies any sign of convulsion or foaming at the mouth.  Reports she was initially unresponsive but then was able to arouse but very confused but as time progressed became more alert.  Patient denies nausea or vomiting, chest pain or shortness of breath.  EMS was called and was brought to the ER.     Patient denies current PCP.  Medications have been managed by psychiatry.  Recently stopped Prozac and was started on Lexapro.  Patient also reports 7 months ago was given clonidine as needed for hypertension.  Patient reports does not have a home blood pressure monitor and only takes medication when her \"palms get sweaty\".  Last dose was 8 PM last night.  Patient reports sobriety of 16 months from drug abuse.  Positive for nicotine and vape use.     ER findings blood glucose 169, potassium 3.0.  Magnesium 1.6.  Negative for leukocytosis.  Anemia at 9.1.  Last known baseline was 9.7 on 8/21/20.  Hemoglobin has been trending down since January 2, 2019.  Urinalysis no CNS indicated.  Negative for any ethanol use urine tox screen was negative.  Chest x-ray no acute findings.  Blood pressure 117/68 and heart rate in the low 100s.  EKG " no acute findings.  - Portions of the above HPI were copied from previous encounters and edited as appropriate. PMH as detailed below.     Pt states she had gotten up at night to use the restroom. She remembers sitting down to urinate and recalls feeling lightheaded, but does not recall anything else until she woke up in the shower with her mom standing next to her. She believes she fell off the toilet into the shower. She states her head and neck hurt initially, but states that has improved and she denies any significant injury. Incontinence was reported. No tongue bites. The episode was not witnessed. Pt did not have any reported postictal state. She does recall feeling lightheaded and states she has dealt with cardiac issues including tachycardia for a long time. She is unsure if she has been evaluated for this and is unaware of any diagnosis.  She denies any hx of syncope or seizures. No similar events in the past.     ED RN, HOWARD Guillen, present throughout interview/exam.     Review of Systems   Constitutional: Negative for chills, diaphoresis and fatigue.   Eyes: Negative for photophobia and visual disturbance.   Neurological: Positive for syncope. Negative for dizziness, tremors, seizures, facial asymmetry, speech difficulty, weakness, light-headedness, numbness and headaches.          PATIENT HISTORY:  History reviewed. No pertinent past medical history., History reviewed. No pertinent surgical history., History reviewed. No pertinent family history.,   Social History     Tobacco Use   • Smoking status: Every Day     Types: Cigarettes   ,   Prior to Admission medications    Medication Sig Start Date End Date Taking? Authorizing Provider   ARIPiprazole (ABILIFY) 5 MG tablet Take 1 tablet by mouth Daily.   Yes Provider, MD Alec   busPIRone (BUSPAR) 5 MG tablet Take 1 tablet by mouth 2 (Two) Times a Day.   Yes Provider, MD Alec   escitalopram (LEXAPRO) 10 MG tablet Take 1 tablet by mouth Daily.   Yes  Provider, MD Alec   naltrexone (DEPADE) 50 MG tablet Take 1 tablet by mouth Daily.   Yes Provider, MD Alec   cephalexin (KEFLEX) 500 MG capsule Take 1 capsule by mouth 3 (Three) Times a Day. 6/26/20 11/11/22  Remy Dodson MD   sulfamethoxazole-trimethoprim (BACTRIM DS,SEPTRA DS) 800-160 MG per tablet Take 1 tablet by mouth 2 (Two) Times a Day. 6/23/20 11/11/22  Esa Galeas MD    Allergies:  Wellbutrin [bupropion]    Current Facility-Administered Medications   Medication Dose Route Frequency Provider Last Rate Last Admin   • ARIPiprazole (ABILIFY) tablet 5 mg  5 mg Oral Daily Abreu, Shannan, APRN   5 mg at 11/11/22 0826   • busPIRone (BUSPAR) tablet 5 mg  5 mg Oral BID Abreu, Shannan, APRN   5 mg at 11/11/22 0826   • escitalopram (LEXAPRO) tablet 10 mg  10 mg Oral Daily Abreu, Shannan, APRN   10 mg at 11/11/22 0826   • naltrexone (DEPADE) tablet 50 mg  50 mg Oral Daily Abreu, Shannan, APRN       • nicotine (NICODERM CQ) 14 MG/24HR patch 1 patch  1 patch Transdermal Q24H Sanchez Abreua, APRN   1 patch at 11/11/22 0811   • nitroglycerin (NITROSTAT) SL tablet 0.4 mg  0.4 mg Sublingual Q5 Min PRN Sarah Becker, DO       • sodium chloride 0.9 % flush 10 mL  10 mL Intravenous PRN Sarah Becker, DO       • sodium chloride 0.9 % flush 10 mL  10 mL Intravenous Q12H Sarah Becker, DO   10 mL at 11/11/22 0941   • sodium chloride 0.9 % flush 10 mL  10 mL Intravenous PRN Sarah Becker,          Current Outpatient Medications   Medication Sig Dispense Refill   • ARIPiprazole (ABILIFY) 5 MG tablet Take 1 tablet by mouth Daily.     • busPIRone (BUSPAR) 5 MG tablet Take 1 tablet by mouth 2 (Two) Times a Day.     • escitalopram (LEXAPRO) 10 MG tablet Take 1 tablet by mouth Daily.     • naltrexone (DEPADE) 50 MG tablet Take 1 tablet by mouth Daily.          ________________________________________________________        OBJECTIVE:  Upon today's exam, pt is awake and alert. She is pleasant,  cooperative. Boyfriend is at .      Neurologic Exam  PHYSICAL EXAM:    CONSTITUTIONAL: The patient is in no apparent distress, bright awake and alert.      HEENT: There is no tenderness over the temporal arteries bilaterally. Normocephalic, atraumatic. TMJ open symmetrically without tenderness. Periodontal disease.     NECK: ROM normal, supple    RESPIRATORY: Breathing unlabored, Breath sounds are normal    CARDIAC: Regular rate and rhythm. HR 98 while resting in bed    EXTREMITIES:  No clubbing, cyanosis or edema.    PSYCHIATRIC: Mood/affect normal, judgement normal, appropriate    NEUROLOGICAL:    Cognition:   Fully oriented.  Fund of knowledge excellent.  Concentration and attention normal.   Language normal with normal comprehension, fluent speech, intact repetition and naming.   Short and long term memory appears intact    Cranial nerves;    II - pupils bilaterally equal reacting to light,  No new Visual field deficits;  Fundoscopic exam- Not able to be done, non-dilated exam  III,IV,VI: EOMI with no diplopia  V: Normal facial sensations  VII: No facial asymmetry,  VIII: No New hearing abnormality  IX, X, XI: normal gag and shoulder shrug;  XII: tongue is in the midline.    Sensory:  Intact to light touch in all extremities.     Motor: Strength 5/5 bilaterally upper and lower extremities. No involuntary movements present. Normal tone and bulk.  Deep tendon reflexes: 2/4 and symmetrical in biceps, brachioradialis, triceps, bilateral 2/4 knees and ankles. Both plantars are flexor.    Cerebellar: Finger to nose and mirror movements normal bilaterally.    Gait and balance: deferred    ________________________________________________________   RESULTS REVIEW:    VITAL SIGNS:   Temp:  [98 °F (36.7 °C)-98.7 °F (37.1 °C)] 98.7 °F (37.1 °C)  Heart Rate:  [] 98  Resp:  [16] 16  BP: (117-142)/(68-82) 119/74     LABS:      Lab 11/11/22  0139   WBC 5.50   HEMOGLOBIN 9.1*   HEMATOCRIT 28.9*   PLATELETS 445   NEUTROS  ABS 3.00   LYMPHS ABS 2.10   MONOS ABS 0.30   EOS ABS 0.10   MCV 82.5         Lab 11/11/22  0518 11/11/22  0139   SODIUM 138 138   POTASSIUM 3.8 3.0*   CHLORIDE 104 102   CO2 25.0 25.0   ANION GAP 9.0 11.0   BUN 6 6   CREATININE 0.56* 0.68   EGFR 126.1 120.3   GLUCOSE 119* 169*   CALCIUM 8.7 8.9   MAGNESIUM  --  1.6   HEMOGLOBIN A1C 5.6  --          Lab 11/11/22 0139   TOTAL PROTEIN 6.7   ALBUMIN 3.80   GLOBULIN 2.9   ALT (SGPT) 12   AST (SGOT) 14   BILIRUBIN <0.2   ALK PHOS 104                 Lab 11/11/22 0139   IRON 20*   IRON SATURATION 5*   TIBC 431   TRANSFERRIN 289   FERRITIN 6.80*         UA    Urinalysis 11/11/22 11/11/22 0229 0229   Squamous Epithelial Cells, UA  21-30 (A)   Specific Gravity, UA 1.026    Ketones, UA Trace (A)    Blood, UA Negative    Leukocytes, UA Negative    Nitrite, UA Negative    RBC, UA  6-12 (A)   WBC, UA  6-12 (A)   Bacteria, UA  Trace (A)   (A) Abnormal value              Lab Results   Component Value Date    TSH 1.420 08/21/2020    TSH 1.410 08/21/2020    HGBA1C 5.6 11/11/2022       IMAGING STUDIES:  CT Head Without Contrast    Result Date: 11/11/2022  HEAD CT: No acute intracranial hemorrhage. No acute fracture. Mild right mastoid effusion. CERVICAL SPINE CT: No acute fracture or subluxation identified within the limits of this examination. Electronically signed by:  Mir Vegas M.D.  11/11/2022 1:29 AM Mountain Time    CT Cervical Spine Without Contrast    Result Date: 11/11/2022  HEAD CT: No acute intracranial hemorrhage. No acute fracture. Mild right mastoid effusion. CERVICAL SPINE CT: No acute fracture or subluxation identified within the limits of this examination. Electronically signed by:  Mir Vegas M.D.  11/11/2022 1:29 AM Mountain Time    XR Chest 1 View    Result Date: 11/11/2022  IMPRESSION : No acute process.[  Electronically Signed By-Jasen Owen On:11/11/2022 6:44 AM This report was finalized on [id number] by  Jasen Owen, .      I  reviewed the patient's new clinical results.    ________________________________________________________     PROBLEM LIST:    Syncope, unspecified syncope type    Syncope and collapse    Anxiety and depression    Essential hypertension    Iron deficiency anemia    Hypokalemia      ASSESSMENT/PLAN:  1. Syncopal episode, preceded by lightheadedness. Suspect micturition syncope in the setting of anemia and tachycardia. Pt has a hx of tachycardia and is not sure if she has been evaluated. Single seizure cannot be ruled out, though is considered less likey. Epilepsy not established.   - CT head: No acute intracranial hemorrhage. No acute fracture. Mild right mastoid effusion.  - CT C-spine: No acute fracture or subluxation identified  - Pt declines MRI brain. She requests to have a carotid duplex.   - Carotid duplex: pending   - EKG: Sinus tachycardia  - Labs: A1C: 5.6, B12: 462, TSH: P, UDS neg, EtOH neg  - BP stable, orthostatic vitals unremarkable   - EEG is not currently available at this facility, can be done electively as outpt  - PT/OT/ST as appropriate, fall precautions as appropriate, Neuro checks per protocol, DVT prophylaxis, Stroke education  - Pt noted to be tachycardic in the ED. Recommend Holter to rule out dysrhythmia, consider cardiac eval  - Seizure precautions (see below). Driving restrictions and other precautions discussed with pt, she verbalizes understanding.   - No indication for antiepileptics after a single event. This was discussed with pt and she agrees. If she has any similar events in the future and a cardiac cause is ruled out, recommend further workup including EEG and MRI brain w/wo and consider starting antiepileptic medication at that time.   - Recommend outpt sleep study.     2. Sinus Tachycardia  - HR  in ED  - Telemetry  - Consider cardiac evaluation  - Recommend Holter monitor at discharge, f/u with outpt cardiology     3. Abnormal UA  - UA shows trace bacteria, small mucus,  neg nitrites/leuk  - Culture pending   - Antibiotics per primary if indicated     4. Anemia, iron deficiency   - Hgb 9.1, Records show Hbg 9.7 oin 8/2020, 11.8 in 1/2019   - Iron 20, ferritin 6.8, iron sat 5, transferrin 289, TIBC 431  - No reports of recent or active bleeding, GI bleeding, or abd pain  - Per primary     5. Hx substance abuse  - On naltrexone   - Pt reports sobriety >1 year    6. hypokalemia   - Replacement per primary     7. Tobacco abuse  - Smoking cessation education  - Nicotine patch if desired.     SEIZURE/SYNCOPE INSTRUCTIONS:  -Recommended to observe all seizure precautions, including, but not limited to:   -No driving until seizure free for more than 3 months- as per State driving regulation / law;   -Avoid all high-risk activity, Take showers instead of baths, Avoid swimming without observation, Avoid open heat sources, Avoid working at heights, and Avoid engaging in all potentially hazardous activities.   -Patient expressed clear understanding.    Will sign off pending carotid duplex. Please call with questions or concerns.     I discussed the patient's findings and my recommendations with patient, nursing staff and consulting provider    Uma Bauer, APRN  11/11/22  10:50 EST

## 2022-11-12 ENCOUNTER — APPOINTMENT (OUTPATIENT)
Dept: RESPIRATORY THERAPY | Facility: HOSPITAL | Age: 32
End: 2022-11-12

## 2022-11-12 VITALS
DIASTOLIC BLOOD PRESSURE: 65 MMHG | RESPIRATION RATE: 18 BRPM | BODY MASS INDEX: 49.61 KG/M2 | OXYGEN SATURATION: 96 % | HEART RATE: 87 BPM | SYSTOLIC BLOOD PRESSURE: 130 MMHG | TEMPERATURE: 98.5 F | WEIGHT: 280 LBS | HEIGHT: 63 IN

## 2022-11-12 LAB — BACTERIA SPEC AEROBE CULT: NORMAL

## 2022-11-12 PROCEDURE — G0378 HOSPITAL OBSERVATION PER HR: HCPCS

## 2022-11-12 PROCEDURE — 93225 XTRNL ECG REC<48 HRS REC: CPT

## 2022-11-12 RX ORDER — CEFDINIR 300 MG/1
300 CAPSULE ORAL 2 TIMES DAILY
Qty: 10 CAPSULE | Refills: 0 | Status: SHIPPED | OUTPATIENT
Start: 2022-11-12 | End: 2022-11-17

## 2022-11-12 RX ORDER — FERROUS SULFATE 325(65) MG
324 TABLET ORAL 2 TIMES DAILY WITH MEALS
Qty: 60 TABLET | Refills: 0 | Status: SHIPPED | OUTPATIENT
Start: 2022-11-12 | End: 2022-12-12

## 2022-11-12 RX ADMIN — NICOTINE 1 PATCH: 14 PATCH, EXTENDED RELEASE TRANSDERMAL at 08:41

## 2022-11-12 RX ADMIN — BUSPIRONE HYDROCHLORIDE 5 MG: 5 TABLET ORAL at 08:41

## 2022-11-12 RX ADMIN — ESCITALOPRAM OXALATE 10 MG: 10 TABLET ORAL at 08:41

## 2022-11-12 RX ADMIN — ARIPIPRAZOLE 5 MG: 5 TABLET ORAL at 08:40

## 2022-11-12 NOTE — PLAN OF CARE
Goal Outcome Evaluation:pt being discharged home with instructions to follow up with pcp

## 2022-11-12 NOTE — DISCHARGE SUMMARY
[facility]  DISCHARGE SUMMARY    Patient Name: Uma Adams  : 1990  MRN: [id number]    Date of Admission: 2022  Discharge Diagnosis:    Syncopal episode  Hypertension  Probable UTI  Hypokalemia-replaced  Iron deficiency anemia    Date of Discharge:2022  Primary Care Physician: Provider, No Known      Presenting Problem:   Syncope, unspecified syncope type [R55]    Active and Resolved Hospital Problems:  Active Hospital Problems    Diagnosis POA   • **Syncope, unspecified syncope type [R55] Yes   • Syncope and collapse [R55] Unknown   • Anxiety and depression [F41.9, F32.A] Unknown   • Essential hypertension [I10] Unknown   • Iron deficiency anemia [D50.9] Unknown   • Hypokalemia [E87.6] Unknown      Resolved Hospital Problems   No resolved problems to display.         Hospital Course     Hospital Course:    Patient is a 32-year-old female with medical history significant for anxiety/depression and hypertension.  Presented to [facility] ED on 2022 after a syncopal episode with loss of consciousness.    Patient stated that the last thing she remembered was waking up with an urge to void  Patient was admitted as observation.  CT head done showed no acute intracranial abnormality.  CT cervical spine showed no acute fracture no subluxation.  Patient declined MRI brain  Carotid ultrasound- bilateral ICA with mild stenosis.    Patient was seen by neurologist who thought patient has probable micturition syncope.  Seizure/syncope precaution was discussed with the patient.  No indication for antiepileptic at this time.  If patient has any similar events in future- further work-up including EEG and MRI and probably starting on antiepileptic medication would be considered.    Probable UTI-discharged on Omnicef.    Patient to follow-up with PCP    Condition at discharge-stable    DISCHARGE Follow Up Recommendations for labs and diagnostics:      Outpatient sleep study recommended    Reasons For Change In Medications and Indications for New Medications:      Day of Discharge     Vital Signs:  Temp:  [97.9 °F (36.6 °C)-99.3 °F (37.4 °C)] 98.6 °F (37 °C)  Heart Rate:  [84-97] 91  Resp:  [15-18] 15  BP: (119-129)/(71-84) 129/83    Physical Exam:  Physical Exam  Vitals reviewed.   Constitutional:       General: She is not in acute distress.     Appearance: She is obese.   HENT:      Head: Normocephalic and atraumatic.      Nose: Nose normal.      Mouth/Throat:      Mouth: Mucous membranes are moist.   Eyes:      Extraocular Movements: Extraocular movements intact.      Conjunctiva/sclera: Conjunctivae normal.      Pupils: Pupils are equal, round, and reactive to light.   Cardiovascular:      Rate and Rhythm: Normal rate and regular rhythm.   Pulmonary:      Effort: No respiratory distress.      Breath sounds: Normal breath sounds.   Abdominal:      General: Bowel sounds are normal.      Palpations: Abdomen is soft.      Tenderness: There is no abdominal tenderness.   Musculoskeletal:         General: Normal range of motion.      Cervical back: Neck supple.   Skin:     General: Skin is warm and dry.   Neurological:      Mental Status: She is alert and oriented to person, place, and time.   Psychiatric:         Mood and Affect: Mood normal.            Pertinent  and/or Most Recent Results     LAB RESULTS:      Lab 11/11/22 0139   WBC 5.50   HEMOGLOBIN 9.1*   HEMATOCRIT 28.9*   PLATELETS 445   NEUTROS ABS 3.00   LYMPHS ABS 2.10   MONOS ABS 0.30   EOS ABS 0.10   MCV 82.5         Lab 11/11/22  0518 11/11/22 0139   SODIUM 138 138   POTASSIUM 3.8 3.0*   CHLORIDE 104 102   CO2 25.0 25.0   ANION GAP 9.0 11.0   BUN 6 6   CREATININE 0.56* 0.68   EGFR 126.1 120.3   GLUCOSE 119* 169*   CALCIUM 8.7 8.9   MAGNESIUM  --  1.6   HEMOGLOBIN A1C 5.6  --    TSH 1.170  --          Lab 11/11/22 0139   TOTAL PROTEIN 6.7   ALBUMIN 3.80   GLOBULIN 2.9   ALT (SGPT) 12   AST (SGOT)  14   BILIRUBIN <0.2   ALK PHOS 104                 Lab 11/11/22 0139   IRON 20*   IRON SATURATION 5*   TIBC 431   TRANSFERRIN 289   FERRITIN 6.80*   FOLATE 9.55   VITAMIN B 12 462         Brief Urine Lab Results  (Last result in the past 365 days)      Color   Clarity   Blood   Leuk Est   Nitrite   Protein   CREAT   Urine HCG        11/11/22 0229 Yellow   Hazy   Negative   Negative   Negative   100 mg/dL (2+)           11/11/22 0229               Negative           Microbiology Results (last 10 days)     ** No results found for the last 240 hours. **          CT Head Without Contrast    Result Date: 11/11/2022  Impression: HEAD CT: No acute intracranial hemorrhage. No acute fracture. Mild right mastoid effusion. CERVICAL SPINE CT: No acute fracture or subluxation identified within the limits of this examination. Electronically signed by:  Mir Vegas M.D.  11/11/2022 1:29 AM Mountain Time    CT Cervical Spine Without Contrast    Result Date: 11/11/2022  Impression: HEAD CT: No acute intracranial hemorrhage. No acute fracture. Mild right mastoid effusion. CERVICAL SPINE CT: No acute fracture or subluxation identified within the limits of this examination. Electronically signed by:  Mir Vegas M.D.  11/11/2022 1:29 AM Mountain Time    XR Chest 1 View    Result Date: 11/11/2022  Impression: IMPRESSION : No acute process.[  Electronically Signed By-Jasen Owen On:11/11/2022 6:44 AM This report was finalized on [id number] by  Jasen Owen, .      Results for orders placed during the hospital encounter of 11/11/22    Duplex Carotid Ultrasound CAR    Interpretation Summary  •  Proximal right internal carotid artery mild stenosis.  •  Distal left internal carotid artery mild stenosis.  •  Bilateral mild velocity elevations noted without plaque seen.  Normal study for age.      Results for orders placed during the hospital encounter of 11/11/22    Duplex Carotid Ultrasound CAR    Interpretation  Summary  •  Proximal right internal carotid artery mild stenosis.  •  Distal left internal carotid artery mild stenosis.  •  Bilateral mild velocity elevations noted without plaque seen.  Normal study for age.          Labs Pending at Discharge:  Pending Labs     Order Current Status    Urine Culture - Urine, Urine, Catheter In process          Procedures Performed           Consults:   Consults     Date and Time Order Name Status Description    11/11/2022  3:51 AM Inpatient Neurology Consult General Completed     11/11/2022  3:41 AM Hospitalist (on-call MD unless specified)              Discharge Details        Discharge Medications      New Medications      Instructions Start Date   cefdinir 300 MG capsule  Commonly known as: OMNICEF   300 mg, Oral, 2 Times Daily      ferrous gluconate 324 MG tablet  Commonly known as: FERGON   324 mg, Oral, 3 Times Daily With Meals         Continue These Medications      Instructions Start Date   ARIPiprazole 5 MG tablet  Commonly known as: ABILIFY   5 mg, Oral, Daily      busPIRone 10 MG tablet  Commonly known as: BUSPAR   10 mg, Oral, 2 Times Daily      escitalopram 10 MG tablet  Commonly known as: LEXAPRO   10 mg, Oral, Daily      naltrexone 50 MG tablet  Commonly known as: DEPADE   50 mg, Oral, Daily      prazosin 2 MG capsule  Commonly known as: MINIPRESS   2 mg, Oral, Nightly             Allergies   Allergen Reactions   • Wellbutrin [Bupropion] Hives         Discharge Disposition:   Home or Self Care    Diet:  Hospital:  Diet Order   Procedures   • Diet Regular         Discharge Activity:   Activity Instructions     Other Activity Instructions      Activity Instructions: Seizures/syncope precaution as discussed with neurologist            CODE STATUS:  Code Status and Medical Interventions:   Ordered at: 11/11/22 0430     Code Status (Patient has no pulse and is not breathing):    CPR (Attempt to Resuscitate)     Medical Interventions (Patient has pulse or is breathing):     Full Support         No future appointments.    Additional Instructions for the Follow-ups that You Need to Schedule     Discharge Follow-up with PCP   As directed       Currently Documented PCP:    Provider, No Known    PCP Phone Number:    None     Follow Up Details: Follow-up with PCP in 1 week               Time spent on Discharge including face to face service: 33 minutes    This patient has been examined with  appropriate PPE . 11/12/22      Signature: Electronically signed by Corwin Moeller MD, 11/12/22, 12:13 PM EST.

## 2022-11-14 LAB — QT INTERVAL: 341 MS

## 2022-11-14 NOTE — CASE MANAGEMENT/SOCIAL WORK
Case Management Discharge Note      Final Note: Home     Discharged prior to CM assessment.     Selected Continued Care - Discharged on 11/12/2022 Admission date: 11/11/2022 - Discharge disposition: Home or Self Care            Transportation Services  Private: Car    Final Discharge Disposition Code: 01 - home or self-care

## 2022-11-17 PROCEDURE — 93227 XTRNL ECG REC<48 HR R&I: CPT | Performed by: INTERNAL MEDICINE

## 2022-12-09 ENCOUNTER — APPOINTMENT (OUTPATIENT)
Dept: CT IMAGING | Facility: HOSPITAL | Age: 32
End: 2022-12-09

## 2022-12-09 ENCOUNTER — APPOINTMENT (OUTPATIENT)
Dept: GENERAL RADIOLOGY | Facility: HOSPITAL | Age: 32
End: 2022-12-09

## 2022-12-09 ENCOUNTER — HOSPITAL ENCOUNTER (EMERGENCY)
Facility: HOSPITAL | Age: 32
Discharge: HOME | End: 2022-12-09
Attending: EMERGENCY MEDICINE | Admitting: EMERGENCY MEDICINE

## 2022-12-09 VITALS
DIASTOLIC BLOOD PRESSURE: 70 MMHG | HEART RATE: 92 BPM | TEMPERATURE: 98 F | OXYGEN SATURATION: 99 % | SYSTOLIC BLOOD PRESSURE: 128 MMHG | BODY MASS INDEX: 47.18 KG/M2 | WEIGHT: 256.39 LBS | HEIGHT: 62 IN | RESPIRATION RATE: 16 BRPM

## 2022-12-09 DIAGNOSIS — S16.1XXA STRAIN OF NECK MUSCLE, INITIAL ENCOUNTER: ICD-10-CM
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
## 2022-12-09 DIAGNOSIS — M54.2 NECK PAIN: ICD-10-CM

## 2022-12-09 LAB
ALBUMIN SERPL-MCNC: 4.3 G/DL (ref 3.5–5.2)
ALBUMIN/GLOB SERPL: 1.4 G/DL
ALP SERPL-CCNC: 97 U/L (ref 39–117)
ALT SERPL W P-5'-P-CCNC: 12 U/L (ref 1–33)
ANION GAP SERPL CALCULATED.3IONS-SCNC: 13 MMOL/L (ref 5–15)
AST SERPL-CCNC: 16 U/L (ref 1–32)
B-HCG UR QL: NEGATIVE
BASOPHILS # BLD AUTO: 0.1 10*3/MM3 (ref 0–0.2)
BASOPHILS NFR BLD AUTO: 1.2 % (ref 0–1.5)
BILIRUB SERPL-MCNC: <0.2 MG/DL (ref 0–1.2)
BILIRUB UR QL STRIP: NEGATIVE
BUN SERPL-MCNC: 10 MG/DL (ref 6–20)
BUN/CREAT SERPL: 14.5 (ref 7–25)
CALCIUM SPEC-SCNC: 9.3 MG/DL (ref 8.6–10.5)
CHLORIDE SERPL-SCNC: 103 MMOL/L (ref 98–107)
CLARITY UR: CLEAR
CO2 SERPL-SCNC: 23 MMOL/L (ref 22–29)
COLOR UR: YELLOW
CREAT SERPL-MCNC: 0.69 MG/DL (ref 0.57–1)
DEPRECATED RDW RBC AUTO: 54.7 FL (ref 37–54)
EGFRCR SERPLBLD CKD-EPI 2021: 119.9 ML/MIN/1.73
EOSINOPHIL # BLD AUTO: 0.1 10*3/MM3 (ref 0–0.4)
EOSINOPHIL NFR BLD AUTO: 2.5 % (ref 0.3–6.2)
ERYTHROCYTE [DISTWIDTH] IN BLOOD BY AUTOMATED COUNT: 18.5 % (ref 12.3–15.4)
GLOBULIN UR ELPH-MCNC: 3.1 GM/DL
GLUCOSE SERPL-MCNC: 131 MG/DL (ref 65–99)
GLUCOSE UR STRIP-MCNC: NEGATIVE MG/DL
HCT VFR BLD AUTO: 31.8 % (ref 34–46.6)
HGB BLD-MCNC: 9.6 G/DL (ref 12–15.9)
HGB UR QL STRIP.AUTO: NEGATIVE
KETONES UR QL STRIP: NEGATIVE
LEUKOCYTE ESTERASE UR QL STRIP.AUTO: NEGATIVE
LYMPHOCYTES # BLD AUTO: 2.5 10*3/MM3 (ref 0.7–3.1)
LYMPHOCYTES NFR BLD AUTO: 45.5 % (ref 19.6–45.3)
MCH RBC QN AUTO: 25 PG (ref 26.6–33)
MCHC RBC AUTO-ENTMCNC: 30.1 G/DL (ref 31.5–35.7)
MCV RBC AUTO: 83.2 FL (ref 79–97)
MONOCYTES # BLD AUTO: 0.4 10*3/MM3 (ref 0.1–0.9)
MONOCYTES NFR BLD AUTO: 7.8 % (ref 5–12)
NEUTROPHILS NFR BLD AUTO: 2.4 10*3/MM3 (ref 1.7–7)
NEUTROPHILS NFR BLD AUTO: 43 % (ref 42.7–76)
NITRITE UR QL STRIP: NEGATIVE
NRBC BLD AUTO-RTO: 0.1 /100 WBC (ref 0–0.2)
PH UR STRIP.AUTO: 7 [PH] (ref 5–8)
PLATELET # BLD AUTO: 534 10*3/MM3 (ref 140–450)
PMV BLD AUTO: 7 FL (ref 6–12)
POTASSIUM SERPL-SCNC: 3.9 MMOL/L (ref 3.5–5.2)
PROT SERPL-MCNC: 7.4 G/DL (ref 6–8.5)
PROT UR QL STRIP: NEGATIVE
RBC # BLD AUTO: 3.82 10*6/MM3 (ref 3.77–5.28)
SODIUM SERPL-SCNC: 139 MMOL/L (ref 136–145)
SP GR UR STRIP: 1.01 (ref 1–1.03)
UROBILINOGEN UR QL STRIP: NORMAL
WBC NRBC COR # BLD: 5.5 10*3/MM3 (ref 3.4–10.8)

## 2022-12-09 PROCEDURE — 85025 COMPLETE CBC W/AUTO DIFF WBC: CPT | Performed by: NURSE PRACTITIONER

## 2022-12-09 PROCEDURE — 81003 URINALYSIS AUTO W/O SCOPE: CPT | Performed by: NURSE PRACTITIONER

## 2022-12-09 PROCEDURE — 80053 COMPREHEN METABOLIC PANEL: CPT | Performed by: NURSE PRACTITIONER

## 2022-12-09 PROCEDURE — 81025 URINE PREGNANCY TEST: CPT | Performed by: NURSE PRACTITIONER

## 2022-12-09 PROCEDURE — 99283 EMERGENCY DEPT VISIT LOW MDM: CPT

## 2022-12-09 PROCEDURE — 72050 X-RAY EXAM NECK SPINE 4/5VWS: CPT

## 2022-12-09 RX ORDER — CYCLOBENZAPRINE HCL 5 MG
5 TABLET ORAL 3 TIMES DAILY PRN
Qty: 12 TABLET | Refills: 0 | Status: SHIPPED | OUTPATIENT
Start: 2022-12-09

## 2022-12-09 RX ORDER — SODIUM CHLORIDE 0.9 % (FLUSH) 0.9 %
10 SYRINGE (ML) INJECTION AS NEEDED
Status: DISCONTINUED | OUTPATIENT
Start: 2022-12-09 | End: 2022-12-09 | Stop reason: HOSPADM
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
## 2022-12-09 NOTE — DISCHARGE INSTRUCTIONS
Tylenol as needed for pain.  Take Flexeril as needed for muscle stiffness or spasms.  May apply heating pad to your neck or back as needed for additional pain relief    Follow-up with primary care for recheck  Return to the ER for new worsening symptoms  You may return to the ER at any point if you require additional evaluation or if you change your mind about CT scan.

## 2024-11-27 ENCOUNTER — INPATIENT HOSPITAL (OUTPATIENT)
Age: 34
End: 2024-11-27
Payer: COMMERCIAL

## 2024-11-27 ENCOUNTER — INPATIENT HOSPITAL (OUTPATIENT)
Dept: URBAN - METROPOLITAN AREA HOSPITAL 76 | Facility: HOSPITAL | Age: 34
End: 2024-11-27
Payer: COMMERCIAL

## 2024-11-27 DIAGNOSIS — K21.9 GASTRO-ESOPHAGEAL REFLUX DISEASE WITHOUT ESOPHAGITIS: ICD-10-CM

## 2024-11-27 PROCEDURE — 99222 1ST HOSP IP/OBS MODERATE 55: CPT
